# Patient Record
Sex: FEMALE | Race: WHITE | NOT HISPANIC OR LATINO
[De-identification: names, ages, dates, MRNs, and addresses within clinical notes are randomized per-mention and may not be internally consistent; named-entity substitution may affect disease eponyms.]

---

## 2018-04-11 PROBLEM — Z00.00 ENCOUNTER FOR PREVENTIVE HEALTH EXAMINATION: Status: ACTIVE | Noted: 2018-04-11

## 2018-04-18 ENCOUNTER — APPOINTMENT (OUTPATIENT)
Dept: SURGERY | Facility: CLINIC | Age: 48
End: 2018-04-18
Payer: MEDICAID

## 2018-04-18 VITALS
HEIGHT: 67 IN | DIASTOLIC BLOOD PRESSURE: 84 MMHG | SYSTOLIC BLOOD PRESSURE: 126 MMHG | WEIGHT: 231 LBS | BODY MASS INDEX: 36.26 KG/M2

## 2018-04-18 PROCEDURE — 99202 OFFICE O/P NEW SF 15 MIN: CPT

## 2018-10-17 ENCOUNTER — APPOINTMENT (OUTPATIENT)
Dept: SURGERY | Facility: CLINIC | Age: 48
End: 2018-10-17

## 2019-03-25 ENCOUNTER — FORM ENCOUNTER (OUTPATIENT)
Age: 49
End: 2019-03-25

## 2019-07-10 ENCOUNTER — APPOINTMENT (OUTPATIENT)
Dept: SURGERY | Facility: CLINIC | Age: 49
End: 2019-07-10

## 2019-09-17 ENCOUNTER — FORM ENCOUNTER (OUTPATIENT)
Age: 49
End: 2019-09-17

## 2020-06-30 ENCOUNTER — FORM ENCOUNTER (OUTPATIENT)
Age: 50
End: 2020-06-30

## 2021-01-12 ENCOUNTER — FORM ENCOUNTER (OUTPATIENT)
Age: 51
End: 2021-01-12

## 2021-01-19 ENCOUNTER — FORM ENCOUNTER (OUTPATIENT)
Age: 51
End: 2021-01-19

## 2021-07-21 ENCOUNTER — APPOINTMENT (OUTPATIENT)
Dept: OBGYN | Facility: CLINIC | Age: 51
End: 2021-07-21
Payer: COMMERCIAL

## 2021-07-21 VITALS
HEART RATE: 71 BPM | WEIGHT: 217 LBS | BODY MASS INDEX: 34.06 KG/M2 | DIASTOLIC BLOOD PRESSURE: 84 MMHG | SYSTOLIC BLOOD PRESSURE: 136 MMHG | HEIGHT: 67 IN | TEMPERATURE: 96.8 F

## 2021-07-21 DIAGNOSIS — N95.1 MENOPAUSAL AND FEMALE CLIMACTERIC STATES: ICD-10-CM

## 2021-07-21 PROCEDURE — 99213 OFFICE O/P EST LOW 20 MIN: CPT

## 2021-07-21 NOTE — HISTORY OF PRESENT ILLNESS
[FreeTextEntry1] : previous visit\par \par \par    	Print\par Patient\par Name	JAMISON CAM (49yo, F) ID# [id number]	Appt. Date/Time	2021 11:20AM\par 	1970	Service Dept.	[facility]\par Provider	SALBADOR PARRISH MD\par Insurance	\par Med Primary: [facility] - PCP ONLY (MEDICAID REPLACEMENT - HMO)\par Insurance # : [id number]\par Prescription: [facility] - Member is eligible. details\par Chief Complaint\par Well Woman Visit\par Patient's Care Team\par Primary Care Provider: DIANDRA BARKER MD: [address] [place] [address], Ph [phone], Fax [phone] NPI: [id number]\par Patient's Pharmacies\par [id number] [address] [place] [address], Ph [phone], Fax [phone]\par Vitals\par 2021 11:24 am\par Ht:	5 ft 7 in\par Wt:	220 lbs\par BMI:	34.5\par BP:	122/76 sitting\par Allergies\par Reviewed Allergies\par PENICILLINS	\par Medications\par Reviewed Medications\par atorvastatin 20 mg tablet\par 19   filled	[facility]\par azithromycin 250 mg tablet\par 05/15/17   filled	[facility]\par betamethasone dipropionate 0.05 % topical cream\par 17   filled	[facility]\par cefdinir 300 mg capsule\par 10/09/17   filled	[facility]\par clotrimazole 1 % topical cream\par 17   filled	[facility]\par clotrimazole-betamethasone 1 %-0.05 % topical cream\par Apply 2 g twice a day by topical route as directed for 7 days.\par 17   prescribed	Salbador Parrish MD\par estradioL-norethindrone acet 0.5 mg-0.1 mg tablet\par Take 1 tablet(s) every day by oral route for 90 days.\par 21   prescribed	Salbador Parrish MD\par ibuprofen 800 mg tablet\par Take 1 tablet(s) twice a day by oral route as needed.\par 18   filled	[facility]\par levothyroxine 50 mcg tablet\par 16   filled	[facility]\par levothyroxine 75 mcg tablet\par 19   filled	[facility]\par levothyroxine 88 mcg tablet\par TAKE 1 TABLET BY MOUTH EVERY DAY\par 20   filled	[facility]\par Necon 1/35 (28) 1 mg-35 mcg tablet\par Take 1 tablet(s) every day by oral route for 28 days.\par 07/14/15   filled	[facility]\par ondansetron HCL 4 mg tablet\par 16   filled	[facility]\par oxyCODONE-acetaminophen 5 mg-325 mg tablet\par 18   filled	[facility]\par Prempro 0.3 mg-1.5 mg tablet\par Take 1 tablet(s) every day by oral route for 90 days.\par 19   prescribed	Salbador Parrish MD\par Synjardy 12.5 mg-1,000 mg tablet\par TAKE 1 TABLET BY MOUTH TWICE A DAY\par 20   filled	[facility]\par terconazole 0.8 % vaginal cream\par Insert 1 applicator(s)ful every day by vaginal route for 3 days.\par 21   prescribed	Salbador Parrish MD\par tobramycin 0.3 % eye drops\par 05/15/17   filled	[facility]\par Vitamin D2 1,250 mcg (50,000 unit) capsule\par 16   filled	[facility]\par Problems\par Reviewed Problems\par Neoplasm of uncertain behavior of ovary\par Vaginitis and vulvovaginitis\par Cyst of ovary\par Menorrhagia\par Menometrorrhagia\par Irregular periods\par Family History\par Reviewed Family History\par Maternal Grandmother	- Malignant tumor of breast\par - previously recorded as Breast Cancer\par Mother	- Diabetes mellitus\par Father	- Heart disease\par - BYPASS\par Social History\par Reviewed Social History\par Routine Gyn\par Tobacco Smoking Status: Former smoker (Notes: electronic cig.)\par Most Recent Tobacco Use Screenin2021\par 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 I	AB. S	ECTOPICS	MULTIPLE	LIVING\par 2							2\par Past Medical History\par Reviewed Past Medical History\par Thyroid Problems: Y\par Screening\par None recorded.\par HPI\par routine gyn exam\par ROS\par Patient reports no fatigue, no fever, no significant weight gain, and no significant weight loss. She reports no abnormal moles and no rashes. She reports no irritation and no vision changes. She reports no hearing loss, no ear pain, no nose/sinus problems, no sore throat, no snoring, no dry mouth, and no mouth ulcers. She reports no dyspnea / shortness of breath, no cough, no sputum production, no hemoptysis, and no wheezing. She reports no chest pain, no palpitations, and no orthopnea. She reports no heartburn, no dysphagia, no nausea, no vomiting, no abdominal pain, no bowel movement changes, no diarrhea, no constipation, and no rectal bleeding. She reports no hematuria, no abnormal bleeding, no flank pain, no trouble urinating, no incontinence, no rash, no lesion, no discharge, no vaginal odor, and no vaginal itching. She reports no menstrual problems and no PMDD symptoms. She reports no menopausal symptoms. She reports no sexual problems. She reports no muscle aches, no muscle weakness, no arthralgias/joint pain, and no back pain. She reports no headaches, no dizziness, no LOC, no weakness, no numbness, and no seizures. She reports no depression, no alcoholism, and no sleep disturbances.\par Physical Exam\par Patient is a 50-year-old female.\par \par Chaperone: Chaperone: present.\par \par Female Genitalia: Vulva: no masses, atrophy, or lesions. Bladder/Urethra: no urethral discharge or mass and normal meatus and bladder non distended. Vagina no tenderness, erythema, cystocele, rectocele, abnormal vaginal discharge, or vesicle(s) or ulcers. Cervix: no discharge or cervical motion tenderness and grossly normal. Uterus: normal size and shape and midline, mobile, non-tender, and no uterine prolapse. Adnexa/Parametria: no parametrial tenderness or mass and no adnexal tenderness or ovarian mass.\par \par Breast: Inspection/Palpation: no erythema, induration, tenderness, skin changes, abnormal secretions, or distinct masses and normal nipple appearance and non tender axillary lymph nodes.\par \par Abdomen: Auscultation/Inspection/Palpation: no tenderness, hepatomegaly, splenomegaly, masses, or CVA tenderness and soft, non-distended, and normal bowel sounds. Hernia: none palpated.\par Assessment / Plan\par 1. Gynecologic examination -\par MAMMO/SONO\par \par Z01.411: Encounter for gynecological examination (general) (routine) with abnormal findings\par PAP, LB\par \par 2. Amenorrhea -\par MENOPAUSE\par \par IF HER MAMMO IS NORMAL, WILL PRESCRIBE HRT AS PER MY DOCUMENTED PRESCRIPTION\par \par \par SONO IS NORMAL, SO SHE CAN START IT IF THE SYMPTOMS ARE A LIFE QUALITY EVENT.\par \par N91.2: Amenorrhea, unspecified\par US, TRANSVAGINAL\par estradiol-norethindrone acet 0.5 mg-0.1 mg tablet - Take 1 tablet(s) every day by oral route for 90 days.     Qty: 90 tablet(s)     Refills: 1     Pharmacy: [id number]\par \par 3. Vaginitis\par N76.0: Acute vaginitis\par terconazole 0.8 % vaginal cream - Insert 1 applicator(s)ful every day by vaginal route for 3 days.     Qty: 3 applicator(s)ful     Refills: 3     Pharmacy: [id number]\par \par \par Return to Office\par None recorded.\par Encounter Sign-Off\par Encounter signed-off by Salbador Parrish MD, 2021.\par Encounter performed and documented by Salbador Parrish MD\par Encounter reviewed & signed by Salbador Parrish MD on 2021 at 11:49am

## 2021-07-25 LAB — HPV HIGH+LOW RISK DNA PNL CVX: NOT DETECTED

## 2021-08-01 LAB — CYTOLOGY CVX/VAG DOC THIN PREP: ABNORMAL

## 2022-01-25 ENCOUNTER — LABORATORY RESULT (OUTPATIENT)
Age: 52
End: 2022-01-25

## 2022-01-26 ENCOUNTER — NON-APPOINTMENT (OUTPATIENT)
Age: 52
End: 2022-01-26

## 2022-01-26 ENCOUNTER — APPOINTMENT (OUTPATIENT)
Dept: OBGYN | Facility: CLINIC | Age: 52
End: 2022-01-26
Payer: COMMERCIAL

## 2022-01-26 VITALS
BODY MASS INDEX: 34.53 KG/M2 | TEMPERATURE: 98 F | SYSTOLIC BLOOD PRESSURE: 136 MMHG | DIASTOLIC BLOOD PRESSURE: 88 MMHG | HEART RATE: 80 BPM | HEIGHT: 67 IN | WEIGHT: 220 LBS

## 2022-01-26 PROCEDURE — 99396 PREV VISIT EST AGE 40-64: CPT

## 2022-01-26 NOTE — HISTORY OF PRESENT ILLNESS
[FreeTextEntry1] : here for annual visit\par feels well\par stopped hrt \par feels better off of it\par hot flashes have lessoned

## 2022-02-06 LAB — CYTOLOGY CVX/VAG DOC THIN PREP: NORMAL

## 2022-02-10 ENCOUNTER — NON-APPOINTMENT (OUTPATIENT)
Age: 52
End: 2022-02-10

## 2022-02-15 ENCOUNTER — APPOINTMENT (OUTPATIENT)
Dept: OBGYN | Facility: CLINIC | Age: 52
End: 2022-02-15
Payer: COMMERCIAL

## 2022-02-15 VITALS
SYSTOLIC BLOOD PRESSURE: 155 MMHG | HEART RATE: 86 BPM | WEIGHT: 219 LBS | BODY MASS INDEX: 34.37 KG/M2 | HEIGHT: 67 IN | TEMPERATURE: 97.3 F | DIASTOLIC BLOOD PRESSURE: 89 MMHG

## 2022-02-15 PROCEDURE — 57454 BX/CURETT OF CERVIX W/SCOPE: CPT

## 2022-02-15 NOTE — PROCEDURE
[Colposcopy] : Colposcopy  [Time out performed] : Pre-procedure time out performed.  Patient's name, date of birth and procedure confirmed. [Consent Obtained] : Consent obtained [Risks] : risks [Benefits] : benefits [Alternatives] : alternatives [Patient] : patient [Infection] : infection [Bleeding] : bleeding [Allergic Reaction] : allergic reaction [No Premedication] : no premedication [Colposcopy Adequate] : colposcopy adequate [No Abnormalities] : no abnormalities [Lesion] : lesion seen [Hemostasis Obtained] : Hemostasis obtained [Tolerated Well] : the patient tolerated the procedure well [de-identified] : WHITE EPITHELIUM BETWEEN 5 AND 7 INSIDE THE EXTERNAL OS, UPPER LIMIT VISUALIZED [de-identified] : 6 [de-identified] : PUNCH/ECC [de-identified] : AWAIT RESULTS

## 2022-02-21 LAB — CORE LAB BIOPSY: NORMAL

## 2022-02-25 ENCOUNTER — NON-APPOINTMENT (OUTPATIENT)
Age: 52
End: 2022-02-25

## 2022-07-08 ENCOUNTER — RX RENEWAL (OUTPATIENT)
Age: 52
End: 2022-07-08

## 2022-07-26 ENCOUNTER — APPOINTMENT (OUTPATIENT)
Dept: OBGYN | Facility: CLINIC | Age: 52
End: 2022-07-26

## 2022-08-16 ENCOUNTER — APPOINTMENT (OUTPATIENT)
Dept: OBGYN | Facility: CLINIC | Age: 52
End: 2022-08-16

## 2022-08-16 ENCOUNTER — TRANSCRIPTION ENCOUNTER (OUTPATIENT)
Age: 52
End: 2022-08-16

## 2022-08-16 ENCOUNTER — LABORATORY RESULT (OUTPATIENT)
Age: 52
End: 2022-08-16

## 2022-08-16 VITALS
HEART RATE: 85 BPM | DIASTOLIC BLOOD PRESSURE: 91 MMHG | BODY MASS INDEX: 32.18 KG/M2 | TEMPERATURE: 98 F | WEIGHT: 205 LBS | HEIGHT: 67 IN | SYSTOLIC BLOOD PRESSURE: 141 MMHG

## 2022-08-16 PROCEDURE — 99213 OFFICE O/P EST LOW 20 MIN: CPT

## 2022-08-20 LAB — HPV HIGH+LOW RISK DNA PNL CVX: DETECTED

## 2022-08-22 ENCOUNTER — NON-APPOINTMENT (OUTPATIENT)
Age: 52
End: 2022-08-22

## 2022-08-25 LAB — CYTOLOGY CVX/VAG DOC THIN PREP: ABNORMAL

## 2023-01-16 ENCOUNTER — RX RENEWAL (OUTPATIENT)
Age: 53
End: 2023-01-16

## 2023-01-25 ENCOUNTER — APPOINTMENT (OUTPATIENT)
Dept: OBGYN | Facility: CLINIC | Age: 53
End: 2023-01-25

## 2023-02-07 ENCOUNTER — LABORATORY RESULT (OUTPATIENT)
Age: 53
End: 2023-02-07

## 2023-02-08 ENCOUNTER — APPOINTMENT (OUTPATIENT)
Dept: OBGYN | Facility: CLINIC | Age: 53
End: 2023-02-08
Payer: COMMERCIAL

## 2023-02-08 VITALS
HEART RATE: 76 BPM | HEIGHT: 67 IN | DIASTOLIC BLOOD PRESSURE: 81 MMHG | WEIGHT: 200 LBS | SYSTOLIC BLOOD PRESSURE: 131 MMHG | BODY MASS INDEX: 31.39 KG/M2

## 2023-02-08 PROCEDURE — 99396 PREV VISIT EST AGE 40-64: CPT | Mod: 25

## 2023-02-08 PROCEDURE — 76830 TRANSVAGINAL US NON-OB: CPT

## 2023-02-08 NOTE — HISTORY OF PRESENT ILLNESS
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

## 2023-02-08 NOTE — PROCEDURE
[FreeTextEntry9] : endometriosis [FreeTextEntry3] : cervix normal\par no free fluid\par left ov absent\par right no cysts\par  [FreeTextEntry5] : 48 cc volume, lining thin:  2 mm [FreeTextEntry7] : 2.2 cc volume [FreeTextEntry8] : absent

## 2023-02-12 LAB
C TRACH RRNA SPEC QL NAA+PROBE: NOT DETECTED
HPV HIGH+LOW RISK DNA PNL CVX: DETECTED
N GONORRHOEA RRNA SPEC QL NAA+PROBE: NOT DETECTED
SOURCE AMPLIFICATION: NORMAL

## 2023-02-17 ENCOUNTER — NON-APPOINTMENT (OUTPATIENT)
Age: 53
End: 2023-02-17

## 2023-02-19 LAB — CYTOLOGY CVX/VAG DOC THIN PREP: ABNORMAL

## 2023-02-24 ENCOUNTER — NON-APPOINTMENT (OUTPATIENT)
Age: 53
End: 2023-02-24

## 2023-03-07 DIAGNOSIS — Z01.818 ENCOUNTER FOR OTHER PREPROCEDURAL EXAMINATION: ICD-10-CM

## 2023-03-14 ENCOUNTER — APPOINTMENT (OUTPATIENT)
Dept: OBGYN | Facility: CLINIC | Age: 53
End: 2023-03-14
Payer: COMMERCIAL

## 2023-03-14 DIAGNOSIS — N80.9 ENDOMETRIOSIS, UNSPECIFIED: ICD-10-CM

## 2023-03-14 DIAGNOSIS — B97.7 PAPILLOMAVIRUS AS THE CAUSE OF DISEASES CLASSIFIED ELSEWHERE: ICD-10-CM

## 2023-03-14 DIAGNOSIS — R87.610 ATYPICAL SQUAMOUS CELLS OF UNDETERMINED SIGNIFICANCE ON CYTOLOGIC SMEAR OF CERVIX (ASC-US): ICD-10-CM

## 2023-03-14 LAB
HCG UR QL: NEGATIVE
QUALITY CONTROL: YES

## 2023-03-14 PROCEDURE — 58558Z: CUSTOM

## 2023-03-14 PROCEDURE — 81025 URINE PREGNANCY TEST: CPT

## 2023-03-14 PROCEDURE — 57454 BX/CURETT OF CERVIX W/SCOPE: CPT

## 2023-03-14 NOTE — PROCEDURE
[Colposcopy] : Colposcopy  [HPV High Risk] : HPV high risk [Colposcopy Adequate] : colposcopy adequate [No Abnormalities] : no abnormalities [Hemostasis Obtained] : Hemostasis obtained [Hysteroscopy] : Hysteroscopy [Time out performed] : Pre-procedure time out performed.  Patient's name, date of birth and procedure confirmed. [Consent Obtained] : Consent obtained [Risks] : risks [Benefits] : benefits [Alternatives] : alternatives [Patient] : patient [Infection] : infection [Bleeding] : bleeding [Allergic Reaction] : allergic reaction [rigid] : Using aseptic technique a hysteroscopy was performed using a rigid hysteroscope [Sent to Pathology] : specimen was placed in buffered formalin and sent for pathology [Antibiotics given] : antibiotics given [Hemostasis obtained] : hemostasis obtained [Tolerated Well] : Patient tolerated the procedure well [Aftercare instructions/regstrictions given and follow-up scheduled] : Aftercare instructions/restrictions given and follow-up scheduled [de-identified] : TYPE 16...RECURRENT [de-identified] : WE  [de-identified] : 11/1/ECC [de-identified] : PUNCH X 2 /ECC [de-identified] : CAVITY CLEAR\par ENDOSAMPLING DONE\par BOTH OSTIA VISUALIZED

## 2023-03-15 ENCOUNTER — NON-APPOINTMENT (OUTPATIENT)
Age: 53
End: 2023-03-15

## 2023-03-24 ENCOUNTER — NON-APPOINTMENT (OUTPATIENT)
Age: 53
End: 2023-03-24

## 2023-03-25 LAB — CORE LAB BIOPSY: NORMAL

## 2023-03-27 ENCOUNTER — NON-APPOINTMENT (OUTPATIENT)
Age: 53
End: 2023-03-27

## 2023-03-31 ENCOUNTER — NON-APPOINTMENT (OUTPATIENT)
Age: 53
End: 2023-03-31

## 2023-04-03 ENCOUNTER — OUTPATIENT (OUTPATIENT)
Dept: OUTPATIENT SERVICES | Facility: HOSPITAL | Age: 53
LOS: 1 days | End: 2023-04-03
Payer: COMMERCIAL

## 2023-04-03 ENCOUNTER — RESULT REVIEW (OUTPATIENT)
Age: 53
End: 2023-04-03

## 2023-04-03 VITALS
SYSTOLIC BLOOD PRESSURE: 122 MMHG | DIASTOLIC BLOOD PRESSURE: 64 MMHG | HEIGHT: 66 IN | WEIGHT: 202.83 LBS | TEMPERATURE: 98 F | RESPIRATION RATE: 16 BRPM | HEART RATE: 66 BPM | OXYGEN SATURATION: 100 %

## 2023-04-03 DIAGNOSIS — Z90.721 ACQUIRED ABSENCE OF OVARIES, UNILATERAL: Chronic | ICD-10-CM

## 2023-04-03 DIAGNOSIS — D06.9 CARCINOMA IN SITU OF CERVIX, UNSPECIFIED: ICD-10-CM

## 2023-04-03 DIAGNOSIS — Z01.818 ENCOUNTER FOR OTHER PREPROCEDURAL EXAMINATION: ICD-10-CM

## 2023-04-03 DIAGNOSIS — Z90.49 ACQUIRED ABSENCE OF OTHER SPECIFIED PARTS OF DIGESTIVE TRACT: Chronic | ICD-10-CM

## 2023-04-03 LAB
A1C WITH ESTIMATED AVERAGE GLUCOSE RESULT: 6.2 % — HIGH (ref 4–5.6)
ALBUMIN SERPL ELPH-MCNC: 4.8 G/DL — SIGNIFICANT CHANGE UP (ref 3.5–5.2)
ALP SERPL-CCNC: 159 U/L — HIGH (ref 30–115)
ALT FLD-CCNC: 31 U/L — SIGNIFICANT CHANGE UP (ref 0–41)
ANION GAP SERPL CALC-SCNC: 13 MMOL/L — SIGNIFICANT CHANGE UP (ref 7–14)
APTT BLD: 33.8 SEC — SIGNIFICANT CHANGE UP (ref 27–39.2)
AST SERPL-CCNC: 18 U/L — SIGNIFICANT CHANGE UP (ref 0–41)
BASOPHILS # BLD AUTO: 0.03 K/UL — SIGNIFICANT CHANGE UP (ref 0–0.2)
BASOPHILS NFR BLD AUTO: 0.4 % — SIGNIFICANT CHANGE UP (ref 0–1)
BILIRUB SERPL-MCNC: 0.4 MG/DL — SIGNIFICANT CHANGE UP (ref 0.2–1.2)
BLD GP AB SCN SERPL QL: SIGNIFICANT CHANGE UP
BUN SERPL-MCNC: 14 MG/DL — SIGNIFICANT CHANGE UP (ref 10–20)
CALCIUM SERPL-MCNC: 10.3 MG/DL — SIGNIFICANT CHANGE UP (ref 8.4–10.5)
CHLORIDE SERPL-SCNC: 105 MMOL/L — SIGNIFICANT CHANGE UP (ref 98–110)
CO2 SERPL-SCNC: 27 MMOL/L — SIGNIFICANT CHANGE UP (ref 17–32)
CREAT SERPL-MCNC: 0.5 MG/DL — LOW (ref 0.7–1.5)
EGFR: 113 ML/MIN/1.73M2 — SIGNIFICANT CHANGE UP
EOSINOPHIL # BLD AUTO: 0.25 K/UL — SIGNIFICANT CHANGE UP (ref 0–0.7)
EOSINOPHIL NFR BLD AUTO: 3.5 % — SIGNIFICANT CHANGE UP (ref 0–8)
ESTIMATED AVERAGE GLUCOSE: 131 MG/DL — HIGH (ref 68–114)
GLUCOSE SERPL-MCNC: 97 MG/DL — SIGNIFICANT CHANGE UP (ref 70–99)
HCT VFR BLD CALC: 47.4 % — HIGH (ref 37–47)
HGB BLD-MCNC: 15.1 G/DL — SIGNIFICANT CHANGE UP (ref 12–16)
IMM GRANULOCYTES NFR BLD AUTO: 0.3 % — SIGNIFICANT CHANGE UP (ref 0.1–0.3)
INR BLD: 0.84 RATIO — SIGNIFICANT CHANGE UP (ref 0.65–1.3)
LYMPHOCYTES # BLD AUTO: 2.57 K/UL — SIGNIFICANT CHANGE UP (ref 1.2–3.4)
LYMPHOCYTES # BLD AUTO: 35.8 % — SIGNIFICANT CHANGE UP (ref 20.5–51.1)
MCHC RBC-ENTMCNC: 28.2 PG — SIGNIFICANT CHANGE UP (ref 27–31)
MCHC RBC-ENTMCNC: 31.9 G/DL — LOW (ref 32–37)
MCV RBC AUTO: 88.6 FL — SIGNIFICANT CHANGE UP (ref 81–99)
MONOCYTES # BLD AUTO: 0.53 K/UL — SIGNIFICANT CHANGE UP (ref 0.1–0.6)
MONOCYTES NFR BLD AUTO: 7.4 % — SIGNIFICANT CHANGE UP (ref 1.7–9.3)
NEUTROPHILS # BLD AUTO: 3.77 K/UL — SIGNIFICANT CHANGE UP (ref 1.4–6.5)
NEUTROPHILS NFR BLD AUTO: 52.6 % — SIGNIFICANT CHANGE UP (ref 42.2–75.2)
NRBC # BLD: 0 /100 WBCS — SIGNIFICANT CHANGE UP (ref 0–0)
PLATELET # BLD AUTO: 230 K/UL — SIGNIFICANT CHANGE UP (ref 130–400)
POTASSIUM SERPL-MCNC: 3.9 MMOL/L — SIGNIFICANT CHANGE UP (ref 3.5–5)
POTASSIUM SERPL-SCNC: 3.9 MMOL/L — SIGNIFICANT CHANGE UP (ref 3.5–5)
PROT SERPL-MCNC: 6.6 G/DL — SIGNIFICANT CHANGE UP (ref 6–8)
PROTHROM AB SERPL-ACNC: 9.5 SEC — LOW (ref 9.95–12.87)
RBC # BLD: 5.35 M/UL — SIGNIFICANT CHANGE UP (ref 4.2–5.4)
RBC # FLD: 12.6 % — SIGNIFICANT CHANGE UP (ref 11.5–14.5)
SODIUM SERPL-SCNC: 145 MMOL/L — SIGNIFICANT CHANGE UP (ref 135–146)
WBC # BLD: 7.17 K/UL — SIGNIFICANT CHANGE UP (ref 4.8–10.8)
WBC # FLD AUTO: 7.17 K/UL — SIGNIFICANT CHANGE UP (ref 4.8–10.8)

## 2023-04-03 PROCEDURE — 71046 X-RAY EXAM CHEST 2 VIEWS: CPT

## 2023-04-03 PROCEDURE — 86850 RBC ANTIBODY SCREEN: CPT

## 2023-04-03 PROCEDURE — 85025 COMPLETE CBC W/AUTO DIFF WBC: CPT

## 2023-04-03 PROCEDURE — 86901 BLOOD TYPING SEROLOGIC RH(D): CPT

## 2023-04-03 PROCEDURE — 85610 PROTHROMBIN TIME: CPT

## 2023-04-03 PROCEDURE — 93010 ELECTROCARDIOGRAM REPORT: CPT

## 2023-04-03 PROCEDURE — 71046 X-RAY EXAM CHEST 2 VIEWS: CPT | Mod: 26

## 2023-04-03 PROCEDURE — 83036 HEMOGLOBIN GLYCOSYLATED A1C: CPT

## 2023-04-03 PROCEDURE — 85730 THROMBOPLASTIN TIME PARTIAL: CPT

## 2023-04-03 PROCEDURE — 99214 OFFICE O/P EST MOD 30 MIN: CPT | Mod: 25

## 2023-04-03 PROCEDURE — 80053 COMPREHEN METABOLIC PANEL: CPT

## 2023-04-03 PROCEDURE — 86900 BLOOD TYPING SEROLOGIC ABO: CPT

## 2023-04-03 PROCEDURE — 93005 ELECTROCARDIOGRAM TRACING: CPT

## 2023-04-03 PROCEDURE — 36415 COLL VENOUS BLD VENIPUNCTURE: CPT

## 2023-04-03 NOTE — H&P PST ADULT - NSICDXPASTMEDICALHX_GEN_ALL_CORE_FT
PAST MEDICAL HISTORY:  Diabetes     HLD (hyperlipidemia)     HPV (human papilloma virus) infection     Hypothyroidism     Obesity (BMI 30-39.9)     Ovarian cyst     Smoker

## 2023-04-03 NOTE — H&P PST ADULT - HISTORY OF PRESENT ILLNESS
Patient is a  year old  female presenting to PAST in preparation for PELVIC EXAMINATION UNDER ANESTHESIA, ROBOTIC TOTAL HYSTERECTOMY, RIGHT SALPINGECTOMY, POSSIBLE RIGHT SALPINGO OOPHORECTOMY  on  4/14/23  under anesthesia by Dr. Gamez . pt reports having "HPV for years, he says i should just remove everything to prevent me from getting cancer,  it is precancerous" pt is asymptomatic    PATIENT CURRENTLY DENIES CHEST PAIN  SHORTNESS OF BREATH  PALPITATIONS,  DYSURIA, OR UPPER RESPIRATORY INFECTION IN PAST 2 WEEKS  EXERCISE  TOLERANCE  4 FLIGHT OF STAIRS  WITHOUT SHORTNESS OF BREATH  denies travel outside the [place] in the past 30 days  Patient denies any signs or symptoms of COVID 19 and denies contact with known positive individuals.  They have an appointment for repeat COVID testing pre-procedure and acknowledge its time and place.  They were instructed to quarantine pre-procedure, practice exposure control measures, continue to self-monitor and report any concerns to their proceduralist.  pt advised self quarantine till day of procedure  Anesthesia Alert  NO--Difficult Airway  NO--History of neck surgery or radiation  NO--Limited ROM of neck  NO--History of Malignant hyperthermia  NO--No personal or family history of Pseudocholinesterase deficiency.  NO--Prior Anesthesia Complication  NO--Latex Allergy  NO--Loose teeth  NO--History of Rheumatoid Arthritis  NO--Bleeding risk  NO--RICHARD  NO--Other_____    PT DENIES ANY RASHES, ABRASION, OR OPEN WOUNDS OR CUTS    AS PER THE PT, THIS IS HIS/HER COMPLETE MEDICAL AND SURGICAL HX, INCLUDING MEDICATIONS PRESCRIBED AND OVER THE COUNTER    Patient verbalized understanding of instructions and was given the opportunity to ask questions and have them answered.    pt denies any suicidal ideation or thoughts, pt states not a threat to self or others

## 2023-04-03 NOTE — H&P PST ADULT - REASON FOR ADMISSION
past for PELVIC EXAMINATION UNDER ANESTHESIA, ROBOTIC TOTAL HYSTERECTOMY, RIGHT SALPINGECTOMY, POSSIBLE RIGHT SALPINGO OOPHORECTOMY

## 2023-04-04 ENCOUNTER — APPOINTMENT (OUTPATIENT)
Dept: OBGYN | Facility: CLINIC | Age: 53
End: 2023-04-04
Payer: COMMERCIAL

## 2023-04-04 VITALS
TEMPERATURE: 97.7 F | HEIGHT: 67 IN | BODY MASS INDEX: 31.55 KG/M2 | WEIGHT: 201 LBS | DIASTOLIC BLOOD PRESSURE: 82 MMHG | HEART RATE: 65 BPM | SYSTOLIC BLOOD PRESSURE: 147 MMHG

## 2023-04-04 DIAGNOSIS — F17.200 NICOTINE DEPENDENCE, UNSPECIFIED, UNCOMPLICATED: ICD-10-CM

## 2023-04-04 DIAGNOSIS — Z01.818 ENCOUNTER FOR OTHER PREPROCEDURAL EXAMINATION: ICD-10-CM

## 2023-04-04 DIAGNOSIS — D06.9 CARCINOMA IN SITU OF CERVIX, UNSPECIFIED: ICD-10-CM

## 2023-04-04 PROCEDURE — 57522 CONIZATION OF CERVIX: CPT

## 2023-04-04 RX ORDER — ESTRADIOL AND NORETHINDRONE ACETATE .5; .1 MG/1; MG/1
0.5-0.1 TABLET, FILM COATED ORAL DAILY
Qty: 3 | Refills: 3 | Status: COMPLETED | COMMUNITY
Start: 2021-07-21 | End: 2023-04-04

## 2023-04-04 NOTE — PROCEDURE
[Colposcopy with Loop Electrode Excision of the Cervix] : Colposcopy with loop electrode excision of the cervix [Time out performed] : Pre-procedure time out performed.  Patient's name, date of birth and procedure confirmed. [Consent Obtained] : Consent obtained [Risks] : risks [Benefits] : benefits [Alternatives] : alternatives [Patient] : patient [Infection] : infection [Bleeding] : bleeding [Allergic Reaction] : allergic reaction [Injury to Vagina] : injury to vagina [Injury to Cervix] : injury to cervix [Negative] : negative pregnancy test [Sent to Pathology] : the specimen was placed in buffered formalin and sent for pathology [Tolerated Well] : the patient tolerated the procedure well

## 2023-04-05 ENCOUNTER — NON-APPOINTMENT (OUTPATIENT)
Age: 53
End: 2023-04-05

## 2023-04-07 PROBLEM — E11.9 TYPE 2 DIABETES MELLITUS WITHOUT COMPLICATIONS: Chronic | Status: ACTIVE | Noted: 2023-04-03

## 2023-04-07 PROBLEM — B97.7 PAPILLOMAVIRUS AS THE CAUSE OF DISEASES CLASSIFIED ELSEWHERE: Chronic | Status: ACTIVE | Noted: 2023-04-03

## 2023-04-07 PROBLEM — E66.9 OBESITY, UNSPECIFIED: Chronic | Status: ACTIVE | Noted: 2023-04-03

## 2023-04-07 PROBLEM — N83.209 UNSPECIFIED OVARIAN CYST, UNSPECIFIED SIDE: Chronic | Status: ACTIVE | Noted: 2023-04-03

## 2023-04-07 PROBLEM — E03.9 HYPOTHYROIDISM, UNSPECIFIED: Chronic | Status: ACTIVE | Noted: 2023-04-03

## 2023-04-07 PROBLEM — E78.5 HYPERLIPIDEMIA, UNSPECIFIED: Chronic | Status: ACTIVE | Noted: 2023-04-03

## 2023-04-07 PROBLEM — F17.200 NICOTINE DEPENDENCE, UNSPECIFIED, UNCOMPLICATED: Chronic | Status: ACTIVE | Noted: 2023-04-03

## 2023-04-13 LAB — CORE LAB BIOPSY: NORMAL

## 2023-04-13 NOTE — ASU PATIENT PROFILE, ADULT - SURGERY TIME
"-- Message is from the [employer]--    Reason for Call: nurse needs CT report she said the patient went  [address] . Alternative phone number: [phone](QFJ)   Attention Gn[employer]ist       Turnaround time given to caller: ""This message will be sent to [facility] Provider's name]. The clinical team will fulfill your request as soon as they review your message. \""    " 12:00

## 2023-04-13 NOTE — ASU PATIENT PROFILE, ADULT - FALL HARM RISK - UNIVERSAL INTERVENTIONS
Bed in lowest position, wheels locked, appropriate side rails in place/Call bell, personal items and telephone in reach/Instruct patient to call for assistance before getting out of bed or chair/Non-slip footwear when patient is out of bed/[place] to call system/Physically safe environment - no spills, clutter or unnecessary equipment/Purposeful Proactive Rounding/Room/bathroom lighting operational, light cord in reach

## 2023-04-14 ENCOUNTER — TRANSCRIPTION ENCOUNTER (OUTPATIENT)
Age: 53
End: 2023-04-14

## 2023-04-14 ENCOUNTER — OUTPATIENT (OUTPATIENT)
Dept: INPATIENT UNIT | Facility: HOSPITAL | Age: 53
LOS: 1 days | Discharge: HOME | End: 2023-04-14
Payer: COMMERCIAL

## 2023-04-14 ENCOUNTER — APPOINTMENT (OUTPATIENT)
Dept: OBGYN | Facility: HOSPITAL | Age: 53
End: 2023-04-14

## 2023-04-14 VITALS
DIASTOLIC BLOOD PRESSURE: 68 MMHG | RESPIRATION RATE: 17 BRPM | SYSTOLIC BLOOD PRESSURE: 126 MMHG | HEART RATE: 82 BPM | OXYGEN SATURATION: 95 %

## 2023-04-14 VITALS
RESPIRATION RATE: 16 BRPM | HEIGHT: 66 IN | HEART RATE: 79 BPM | OXYGEN SATURATION: 97 % | TEMPERATURE: 98 F | SYSTOLIC BLOOD PRESSURE: 179 MMHG | DIASTOLIC BLOOD PRESSURE: 83 MMHG | WEIGHT: 202.83 LBS

## 2023-04-14 DIAGNOSIS — E66.9 OBESITY, UNSPECIFIED: ICD-10-CM

## 2023-04-14 DIAGNOSIS — E78.5 HYPERLIPIDEMIA, UNSPECIFIED: ICD-10-CM

## 2023-04-14 DIAGNOSIS — D06.9 CARCINOMA IN SITU OF CERVIX, UNSPECIFIED: ICD-10-CM

## 2023-04-14 DIAGNOSIS — Z90.721 ACQUIRED ABSENCE OF OVARIES, UNILATERAL: Chronic | ICD-10-CM

## 2023-04-14 DIAGNOSIS — E03.9 HYPOTHYROIDISM, UNSPECIFIED: ICD-10-CM

## 2023-04-14 DIAGNOSIS — F17.210 NICOTINE DEPENDENCE, CIGARETTES, UNCOMPLICATED: ICD-10-CM

## 2023-04-14 DIAGNOSIS — N83.201 UNSPECIFIED OVARIAN CYST, RIGHT SIDE: ICD-10-CM

## 2023-04-14 DIAGNOSIS — N80.101 ENDOMETRIOSIS OF RIGHT OVARY, UNSPECIFIED DEPTH: ICD-10-CM

## 2023-04-14 DIAGNOSIS — Z90.49 ACQUIRED ABSENCE OF OTHER SPECIFIED PARTS OF DIGESTIVE TRACT: Chronic | ICD-10-CM

## 2023-04-14 DIAGNOSIS — E11.9 TYPE 2 DIABETES MELLITUS WITHOUT COMPLICATIONS: ICD-10-CM

## 2023-04-14 DIAGNOSIS — N80.03 ADENOMYOSIS OF THE UTERUS: ICD-10-CM

## 2023-04-14 LAB
ABO RH CONFIRMATION: SIGNIFICANT CHANGE UP
GLUCOSE BLDC GLUCOMTR-MCNC: 141 MG/DL — HIGH (ref 70–99)

## 2023-04-14 PROCEDURE — 88342 IMHCHEM/IMCYTCHM 1ST ANTB: CPT | Mod: 26,59

## 2023-04-14 PROCEDURE — 82962 GLUCOSE BLOOD TEST: CPT

## 2023-04-14 PROCEDURE — 88309 TISSUE EXAM BY PATHOLOGIST: CPT | Mod: 26

## 2023-04-14 PROCEDURE — 88342 IMHCHEM/IMCYTCHM 1ST ANTB: CPT

## 2023-04-14 PROCEDURE — 88309 TISSUE EXAM BY PATHOLOGIST: CPT

## 2023-04-14 PROCEDURE — 36415 COLL VENOUS BLD VENIPUNCTURE: CPT

## 2023-04-14 PROCEDURE — 88341 IMHCHEM/IMCYTCHM EA ADD ANTB: CPT

## 2023-04-14 PROCEDURE — 58571 TLH W/T/O 250 G OR LESS: CPT | Mod: 58

## 2023-04-14 PROCEDURE — S2900: CPT

## 2023-04-14 PROCEDURE — C1889: CPT

## 2023-04-14 PROCEDURE — C1751: CPT

## 2023-04-14 PROCEDURE — 88360 TUMOR IMMUNOHISTOCHEM/MANUAL: CPT | Mod: 26

## 2023-04-14 PROCEDURE — 88360 TUMOR IMMUNOHISTOCHEM/MANUAL: CPT

## 2023-04-14 RX ORDER — ACETAMINOPHEN 500 MG
650 TABLET ORAL ONCE
Refills: 0 | Status: COMPLETED | OUTPATIENT
Start: 2023-04-14 | End: 2023-04-14

## 2023-04-14 RX ORDER — LEVOTHYROXINE SODIUM 125 MCG
1 TABLET ORAL
Refills: 0 | DISCHARGE

## 2023-04-14 RX ORDER — ROSUVASTATIN CALCIUM 5 MG/1
1 TABLET ORAL
Refills: 0 | DISCHARGE

## 2023-04-14 RX ORDER — IBUPROFEN 200 MG
1 TABLET ORAL
Qty: 40 | Refills: 0
Start: 2023-04-14 | End: 2023-04-23

## 2023-04-14 RX ORDER — HYDROMORPHONE HYDROCHLORIDE 2 MG/ML
0.5 INJECTION INTRAMUSCULAR; INTRAVENOUS; SUBCUTANEOUS
Refills: 0 | Status: DISCONTINUED | OUTPATIENT
Start: 2023-04-14 | End: 2023-04-14

## 2023-04-14 RX ORDER — ACETAMINOPHEN 500 MG
2 TABLET ORAL
Qty: 60 | Refills: 0
Start: 2023-04-14 | End: 2023-04-23

## 2023-04-14 RX ORDER — SENNA PLUS 8.6 MG/1
2 TABLET ORAL
Qty: 20 | Refills: 0
Start: 2023-04-14 | End: 2023-04-23

## 2023-04-14 RX ORDER — ONDANSETRON 8 MG/1
4 TABLET, FILM COATED ORAL ONCE
Refills: 0 | Status: DISCONTINUED | OUTPATIENT
Start: 2023-04-14 | End: 2023-04-14

## 2023-04-14 RX ORDER — EMPAGLIFLOZIN, METFORMIN HYDROCHLORIDE 10; 1000 MG/1; MG/1
1 TABLET, EXTENDED RELEASE ORAL
Refills: 0 | DISCHARGE

## 2023-04-14 RX ORDER — HYDROMORPHONE HYDROCHLORIDE 2 MG/ML
1 INJECTION INTRAMUSCULAR; INTRAVENOUS; SUBCUTANEOUS
Refills: 0 | Status: DISCONTINUED | OUTPATIENT
Start: 2023-04-14 | End: 2023-04-14

## 2023-04-14 RX ORDER — METOPROLOL TARTRATE 50 MG
1 TABLET ORAL
Qty: 30 | Refills: 0
Start: 2023-04-14 | End: 2023-05-13

## 2023-04-14 RX ADMIN — HYDROMORPHONE HYDROCHLORIDE 0.5 MILLIGRAM(S): 2 INJECTION INTRAMUSCULAR; INTRAVENOUS; SUBCUTANEOUS at 18:18

## 2023-04-14 RX ADMIN — HYDROMORPHONE HYDROCHLORIDE 0.5 MILLIGRAM(S): 2 INJECTION INTRAMUSCULAR; INTRAVENOUS; SUBCUTANEOUS at 18:30

## 2023-04-14 RX ADMIN — Medication 650 MILLIGRAM(S): at 12:00

## 2023-04-14 NOTE — ASU DISCHARGE PLAN (ADULT/PEDIATRIC) - CARE PROVIDER_API CALL
Jp Gamez)  Obstetrics and Gynecology  [facility] [place] [address]  Phone: [phone]  Fax: [phone]  Follow Up Time:

## 2023-04-14 NOTE — CHART NOTE - NSCHARTNOTEFT_GEN_A_CORE
PACU ANESTHESIA ADMISSION NOTE      Procedure: Robot-assisted laparoscopic total hysterectomy with salpingo-oophorectomy for uterus 250 grams or less using da Holden Xi    Cystoscopy    Lysis of pelvic adhesions      Post op diagnosis:  Adenocarcinoma in situ (AIS) of uterine cervix        __x__  Patent Airway    __x__  Full return of protective reflexes    __x__  Full recovery from anesthesia / back to baseline status    Vitals:  T(C): 36.7 (04-14-23 @ 11:53), Max: 36.7 (04-14-23 @ 11:46)  HR: 79 (04-14-23 @ 11:53) (79 - 79)  BP: 179/83 (04-14-23 @ 11:53) (179/83 - 179/83)  RR: 16 (04-14-23 @ 11:53) (16 - 16)  SpO2: 97% (04-14-23 @ 11:53) (97% - 97%)    Mental Status:  __x__ Awake   ___x__ Alert   _____ Drowsy   _____ Sedated    Nausea/Vomiting:  __x__ NO  ______Yes,   See Post - Op Orders          Pain Scale (0-10):  _____    Treatment: ____ None    __x__ See Post - Op/PCA Orders    Post - Operative Fluids:   ____ Oral   __x__ See Post - Op Orders    Plan: Discharge:   __x__Home       _____Floor     _____Critical Care    _____  Other:_________________    Comments: Patient had smooth intraoperative event, no anesthesia complication.    patient made aware that blood pressure before anxiolytics was elevated - remained normal range throughout the case and elevated at extubation- d/w Dr. Gamez now in recovery /78. Patient will be sent home with BP medication to monitor and take if her BP becomes elevated        PACU Vital signs: HR:     93       BP:   140     /   78       RR:    12         O2 Sat:   98    %     Temp 98

## 2023-04-14 NOTE — BRIEF OPERATIVE NOTE - NSICDXBRIEFPROCEDURE_GEN_ALL_CORE_FT
PROCEDURES:  Robot-assisted laparoscopic total hysterectomy with salpingo-oophorectomy for uterus 250 grams or less using da Holden Xi 14-Apr-2023 17:32:12  Roxy Cantu  Cystoscopy 14-Apr-2023 17:32:25  Roxy Cantu  Lysis of pelvic adhesions 14-Apr-2023 17:32:37  Roxy Cantu

## 2023-04-14 NOTE — PRE-ANESTHESIA EVALUATION ADULT - NSANTHADDINFOFT_GEN_ALL_CORE
risks and benefits thoroughly discussed including dental injury , hypoxia, respiratory and cardiac complications including death  patient consented for peripheral nerve block - risks and benefits explained including bleeding, infection, nerve injury, last

## 2023-04-14 NOTE — BRIEF OPERATIVE NOTE - NSICDXBRIEFPREOP_GEN_ALL_CORE_FT
PRE-OP DIAGNOSIS:  Adenocarcinoma in situ (AIS) of uterine cervix 14-Apr-2023 17:32:49  Roxy Cantu

## 2023-04-14 NOTE — BRIEF OPERATIVE NOTE - NSICDXBRIEFPOSTOP_GEN_ALL_CORE_FT
POST-OP DIAGNOSIS:  Adenocarcinoma in situ (AIS) of uterine cervix 14-Apr-2023 17:32:52  Roxy Cantu

## 2023-04-14 NOTE — BRIEF OPERATIVE NOTE - OPERATION/FINDINGS
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

## 2023-04-17 ENCOUNTER — NON-APPOINTMENT (OUTPATIENT)
Age: 53
End: 2023-04-17

## 2023-04-26 ENCOUNTER — APPOINTMENT (OUTPATIENT)
Dept: OBGYN | Facility: CLINIC | Age: 53
End: 2023-04-26
Payer: COMMERCIAL

## 2023-04-26 VITALS
HEART RATE: 85 BPM | SYSTOLIC BLOOD PRESSURE: 110 MMHG | BODY MASS INDEX: 30.76 KG/M2 | DIASTOLIC BLOOD PRESSURE: 73 MMHG | WEIGHT: 196 LBS | HEIGHT: 67 IN

## 2023-04-26 PROCEDURE — 99024 POSTOP FOLLOW-UP VISIT: CPT

## 2023-04-26 RX ORDER — NAPROXEN SODIUM 550 MG/1
550 TABLET ORAL
Qty: 20 | Refills: 0 | Status: ACTIVE | COMMUNITY
Start: 2023-04-26 | End: 1900-01-01

## 2023-04-26 NOTE — HISTORY OF PRESENT ILLNESS
[Pain is well-controlled] : pain is well-controlled [Clean/Dry/Intact] : clean, dry and intact [None] : no vaginal bleeding [Normal] : normal [Pathology reviewed] : pathology reviewed [Fever] : no fever [Chills] : no chills [Nausea] : no nausea [Vomiting] : no vomiting [Erythema] : not erythematous [de-identified] : feels well\par vag dc only

## 2023-04-27 LAB — SURGICAL PATHOLOGY STUDY: SIGNIFICANT CHANGE UP

## 2023-05-24 ENCOUNTER — APPOINTMENT (OUTPATIENT)
Dept: OBGYN | Facility: CLINIC | Age: 53
End: 2023-05-24
Payer: COMMERCIAL

## 2023-05-24 VITALS
DIASTOLIC BLOOD PRESSURE: 72 MMHG | SYSTOLIC BLOOD PRESSURE: 125 MMHG | HEART RATE: 97 BPM | WEIGHT: 202 LBS | BODY MASS INDEX: 31.71 KG/M2 | HEIGHT: 67 IN

## 2023-05-24 DIAGNOSIS — Z98.890 OTHER SPECIFIED POSTPROCEDURAL STATES: ICD-10-CM

## 2023-05-24 PROCEDURE — 99024 POSTOP FOLLOW-UP VISIT: CPT

## 2023-05-24 NOTE — HISTORY OF PRESENT ILLNESS
[Pain is well-controlled] : pain is well-controlled [Clean/Dry/Intact] : clean, dry and intact [None] : no vaginal bleeding [Normal] : normal [Pathology reviewed] : pathology reviewed [Fever] : no fever [Chills] : no chills [Nausea] : no nausea [Vomiting] : no vomiting [Erythema] : not erythematous [de-identified] : hysterectomy

## 2023-10-11 ENCOUNTER — EMERGENCY (EMERGENCY)
Facility: HOSPITAL | Age: 53
LOS: 0 days | Discharge: HOME | End: 2023-10-11
Attending: EMERGENCY MEDICINE
Payer: COMMERCIAL

## 2023-10-11 VITALS
RESPIRATION RATE: 18 BRPM | HEART RATE: 78 BPM | SYSTOLIC BLOOD PRESSURE: 142 MMHG | WEIGHT: 199.96 LBS | DIASTOLIC BLOOD PRESSURE: 86 MMHG | OXYGEN SATURATION: 95 % | TEMPERATURE: 98 F

## 2023-10-11 DIAGNOSIS — M25.511 PAIN IN RIGHT SHOULDER: ICD-10-CM

## 2023-10-11 DIAGNOSIS — M54.2 CERVICALGIA: ICD-10-CM

## 2023-10-11 DIAGNOSIS — Z90.49 ACQUIRED ABSENCE OF OTHER SPECIFIED PARTS OF DIGESTIVE TRACT: Chronic | ICD-10-CM

## 2023-10-11 DIAGNOSIS — E11.9 TYPE 2 DIABETES MELLITUS WITHOUT COMPLICATIONS: ICD-10-CM

## 2023-10-11 DIAGNOSIS — M25.521 PAIN IN RIGHT ELBOW: ICD-10-CM

## 2023-10-11 DIAGNOSIS — E03.9 HYPOTHYROIDISM, UNSPECIFIED: ICD-10-CM

## 2023-10-11 DIAGNOSIS — Z90.721 ACQUIRED ABSENCE OF OVARIES, UNILATERAL: Chronic | ICD-10-CM

## 2023-10-11 DIAGNOSIS — E78.5 HYPERLIPIDEMIA, UNSPECIFIED: ICD-10-CM
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
## 2023-10-11 DIAGNOSIS — Z88.0 ALLERGY STATUS TO PENICILLIN: ICD-10-CM

## 2023-10-11 DIAGNOSIS — M54.50 LOW BACK PAIN, UNSPECIFIED: ICD-10-CM

## 2023-10-11 PROCEDURE — 99284 EMERGENCY DEPT VISIT MOD MDM: CPT

## 2023-10-11 PROCEDURE — 73080 X-RAY EXAM OF ELBOW: CPT | Mod: 26,RT

## 2023-10-11 PROCEDURE — 73030 X-RAY EXAM OF SHOULDER: CPT | Mod: 26,RT

## 2023-10-11 PROCEDURE — 96372 THER/PROPH/DIAG INJ SC/IM: CPT

## 2023-10-11 PROCEDURE — 73080 X-RAY EXAM OF ELBOW: CPT | Mod: RT

## 2023-10-11 PROCEDURE — 99284 EMERGENCY DEPT VISIT MOD MDM: CPT | Mod: 25

## 2023-10-11 PROCEDURE — 73030 X-RAY EXAM OF SHOULDER: CPT | Mod: RT

## 2023-10-11 RX ORDER — METHOCARBAMOL 500 MG/1
2 TABLET, FILM COATED ORAL
Qty: 30 | Refills: 0
Start: 2023-10-11 | End: 2023-10-15

## 2023-10-11 RX ORDER — IBUPROFEN 200 MG
1 TABLET ORAL
Qty: 20 | Refills: 0
Start: 2023-10-11 | End: 2023-10-15

## 2023-10-11 RX ORDER — METHOCARBAMOL 500 MG/1
1500 TABLET, FILM COATED ORAL ONCE
Refills: 0 | Status: COMPLETED | OUTPATIENT
Start: 2023-10-11 | End: 2023-10-11

## 2023-10-11 RX ORDER — KETOROLAC TROMETHAMINE 30 MG/ML
15 SYRINGE (ML) INJECTION ONCE
Refills: 0 | Status: DISCONTINUED | OUTPATIENT
Start: 2023-10-11 | End: 2023-10-11

## 2023-10-11 RX ADMIN — Medication 15 MILLIGRAM(S): at 11:11

## 2023-10-11 RX ADMIN — METHOCARBAMOL 1500 MILLIGRAM(S): 500 TABLET, FILM COATED ORAL at 09:49

## 2023-10-11 RX ADMIN — Medication 15 MILLIGRAM(S): at 09:49
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
## 2023-10-11 NOTE — ED PROVIDER NOTE - PATIENT PORTAL LINK FT
You can access the FollowMyHealth Patient Portal offered by [facility] by registering at the following website: [email or web address]. By joining [facility]’s FollowMyHealth portal, you will also be able to view your health information using other applications (apps) compatible with our system.
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
## 2023-10-11 NOTE — ED ADULT NURSE NOTE - NSFALLUNIVINTERV_ED_ALL_ED
[Initial Consultation] : an initial consultation for [FreeTextEntry2] : Ear pain, L > R and nasal congestion Bed/Stretcher in lowest position, wheels locked, appropriate side rails in place/Call bell, personal items and telephone in reach/Instruct patient to call for assistance before getting out of bed/chair/stretcher/Non-slip footwear applied when patient is off stretcher/[place] to call system/Physically safe environment - no spills, clutter or unnecessary equipment/Purposeful proactive rounding/Room/bathroom lighting operational, light cord in reach
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
## 2023-11-28 ENCOUNTER — APPOINTMENT (OUTPATIENT)
Dept: OBGYN | Facility: CLINIC | Age: 53
End: 2023-11-28
Payer: COMMERCIAL

## 2023-11-28 VITALS
SYSTOLIC BLOOD PRESSURE: 133 MMHG | WEIGHT: 210 LBS | HEIGHT: 66 IN | HEART RATE: 64 BPM | DIASTOLIC BLOOD PRESSURE: 81 MMHG | BODY MASS INDEX: 33.75 KG/M2

## 2023-11-28 DIAGNOSIS — Z87.891 PERSONAL HISTORY OF NICOTINE DEPENDENCE: ICD-10-CM

## 2023-11-28 DIAGNOSIS — N76.0 ACUTE VAGINITIS: ICD-10-CM

## 2023-11-28 PROCEDURE — 99213 OFFICE O/P EST LOW 20 MIN: CPT

## 2023-11-28 RX ORDER — LEVOTHYROXINE SODIUM 100 UG/1
100 CAPSULE ORAL
Refills: 0 | Status: ACTIVE | COMMUNITY

## 2023-11-28 RX ORDER — CLOTRIMAZOLE AND BETAMETHASONE DIPROPIONATE 10; .5 MG/G; MG/G
1-0.05 CREAM TOPICAL TWICE DAILY
Qty: 1 | Refills: 3 | Status: ACTIVE | COMMUNITY
Start: 2023-11-28 | End: 1900-01-01

## 2023-12-30 ENCOUNTER — RX RENEWAL (OUTPATIENT)
Age: 53
End: 2023-12-30

## 2023-12-30 RX ORDER — TERCONAZOLE 8 MG/G
0.8 CREAM VAGINAL
Qty: 20 | Refills: 3 | Status: ACTIVE | COMMUNITY
Start: 2022-07-08 | End: 1900-01-01

## 2024-02-15 NOTE — PRE-ANESTHESIA EVALUATION ADULT - SPO2 (%)
inability to wall inability to wall inability to wall inability to wall inability to wall inability to wall inability to wall inability to wall inability to wall 97

## 2024-05-28 ENCOUNTER — APPOINTMENT (OUTPATIENT)
Dept: OBGYN | Facility: CLINIC | Age: 54
End: 2024-05-28
Payer: COMMERCIAL

## 2024-05-28 VITALS
SYSTOLIC BLOOD PRESSURE: 143 MMHG | DIASTOLIC BLOOD PRESSURE: 86 MMHG | BODY MASS INDEX: 33.59 KG/M2 | HEIGHT: 66 IN | WEIGHT: 209 LBS | HEART RATE: 76 BPM

## 2024-05-28 DIAGNOSIS — Z01.419 ENCOUNTER FOR GYNECOLOGICAL EXAMINATION (GENERAL) (ROUTINE) W/OUT ABNORMAL FINDINGS: ICD-10-CM

## 2024-05-28 LAB
BILIRUB UR QL STRIP: NORMAL
CLARITY UR: CLEAR
COLLECTION METHOD: NORMAL
GLUCOSE UR-MCNC: 1000
HCG UR QL: 0.2 EU/DL
HGB UR QL STRIP.AUTO: NORMAL
KETONES UR-MCNC: ABNORMAL
LEUKOCYTE ESTERASE UR QL STRIP: NORMAL
NITRITE UR QL STRIP: NORMAL
PH UR STRIP: 5.5
PROT UR STRIP-MCNC: NORMAL
SP GR UR STRIP: 1.02

## 2024-05-28 PROCEDURE — 81003 URINALYSIS AUTO W/O SCOPE: CPT | Mod: QW

## 2024-05-28 PROCEDURE — 99396 PREV VISIT EST AGE 40-64: CPT | Mod: 25

## 2024-05-28 NOTE — PHYSICAL EXAM
[Labia Majora] : normal [Labia Minora] : normal [Normal] : normal [Absent] : absent [Uterine Adnexae] : absent

## 2024-06-02 LAB — HPV HIGH+LOW RISK DNA PNL CVX: NOT DETECTED

## 2024-06-08 LAB — CYTOLOGY CVX/VAG DOC THIN PREP: ABNORMAL

## 2025-06-03 DIAGNOSIS — Z12.39 ENCOUNTER FOR OTHER SCREENING FOR MALIGNANT NEOPLASM OF BREAST: ICD-10-CM

## 2025-06-05 ENCOUNTER — APPOINTMENT (OUTPATIENT)
Dept: OBGYN | Facility: CLINIC | Age: 55
End: 2025-06-05
Payer: COMMERCIAL

## 2025-06-05 VITALS
HEART RATE: 79 BPM | WEIGHT: 167 LBS | SYSTOLIC BLOOD PRESSURE: 122 MMHG | DIASTOLIC BLOOD PRESSURE: 78 MMHG | HEIGHT: 66 IN | BODY MASS INDEX: 26.84 KG/M2

## 2025-06-05 DIAGNOSIS — B97.7 PAPILLOMAVIRUS AS THE CAUSE OF DISEASES CLASSIFIED ELSEWHERE: ICD-10-CM

## 2025-06-05 DIAGNOSIS — Z01.419 ENCOUNTER FOR GYNECOLOGICAL EXAMINATION (GENERAL) (ROUTINE) W/OUT ABNORMAL FINDINGS: ICD-10-CM

## 2025-06-05 PROCEDURE — 99459 PELVIC EXAMINATION: CPT

## 2025-06-05 PROCEDURE — 99386 PREV VISIT NEW AGE 40-64: CPT

## 2025-06-05 RX ORDER — EMPAGLIFLOZIN AND METFORMIN HYDROCHLORIDE 12.5; 1 MG/1; MG/1
TABLET ORAL
Refills: 0 | Status: ACTIVE | COMMUNITY

## 2025-06-05 RX ORDER — ROSUVASTATIN CALCIUM 5 MG/1
5 TABLET, FILM COATED ORAL
Refills: 0 | Status: ACTIVE | COMMUNITY

## 2025-06-05 RX ORDER — SEMAGLUTIDE 1.34 MG/ML
INJECTION, SOLUTION SUBCUTANEOUS
Refills: 0 | Status: ACTIVE | COMMUNITY

## 2025-06-15 LAB — HPV HIGH+LOW RISK DNA PNL CVX: NOT DETECTED
